# Patient Record
Sex: FEMALE | Race: WHITE | NOT HISPANIC OR LATINO | Employment: STUDENT | URBAN - METROPOLITAN AREA
[De-identification: names, ages, dates, MRNs, and addresses within clinical notes are randomized per-mention and may not be internally consistent; named-entity substitution may affect disease eponyms.]

---
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
## 2018-01-14 VITALS
SYSTOLIC BLOOD PRESSURE: 100 MMHG | WEIGHT: 118.13 LBS | BODY MASS INDEX: 21.74 KG/M2 | DIASTOLIC BLOOD PRESSURE: 64 MMHG | HEIGHT: 62 IN

## 2018-01-17 NOTE — MISCELLANEOUS
Message  Spoke with patient regarding vaginal culture results  Negative for BV, yeast, GC, chlamydia, herpes, and trichomonas  Patient going for blood work tomorrow  Has had two appointments with her therapist; going well so far  Patient states she is doing OK  We will call with blood work results  Signatures   Electronically signed by : Melanie Fish, [facility];  Aug 22 2017  4:42PM EST                       (Author)

## 2018-01-18 NOTE — MISCELLANEOUS
Message  Spoke with patient regarding blood work results  Negative for syphilis, Hep B&C, Herpes, and HIV  Patient is back at school and feeling well  Still seeing a therapist, whom she likes  Call if any issues arise  F/u in February for repeat HIV testing  Signatures   Electronically signed by : Carlton Clayton, [facility];  Aug 31 2017  5:32PM EST                       (Author)

## 2018-05-24 ENCOUNTER — APPOINTMENT (OUTPATIENT)
Dept: LAB | Age: 18
End: 2018-05-24
Payer: COMMERCIAL

## 2018-05-24 ENCOUNTER — TRANSCRIBE ORDERS (OUTPATIENT)
Dept: ADMINISTRATIVE | Age: 18
End: 2018-05-24

## 2018-05-24 ENCOUNTER — OFFICE VISIT (OUTPATIENT)
Dept: OBGYN CLINIC | Facility: CLINIC | Age: 18
End: 2018-05-24
Payer: COMMERCIAL

## 2018-05-24 VITALS
BODY MASS INDEX: 21.9 KG/M2 | DIASTOLIC BLOOD PRESSURE: 64 MMHG | HEIGHT: 61 IN | WEIGHT: 116 LBS | SYSTOLIC BLOOD PRESSURE: 100 MMHG

## 2018-05-24 DIAGNOSIS — T74.21XD RAPE OF ADULT, SUBSEQUENT ENCOUNTER: Primary | ICD-10-CM

## 2018-05-24 DIAGNOSIS — T74.21XD: ICD-10-CM

## 2018-05-24 DIAGNOSIS — Z30.011 ENCOUNTER FOR INITIAL PRESCRIPTION OF CONTRACEPTIVE PILLS: ICD-10-CM

## 2018-05-24 DIAGNOSIS — L29.2 VULVAR ITCHING: ICD-10-CM

## 2018-05-24 DIAGNOSIS — T74.21XD: Primary | ICD-10-CM

## 2018-05-24 LAB
HIV 1+2 AB+HIV1 P24 AG SERPL QL IA: NORMAL
HIV1 P24 AG SER QL: NORMAL

## 2018-05-24 PROCEDURE — 99214 OFFICE O/P EST MOD 30 MIN: CPT | Performed by: PHYSICIAN ASSISTANT

## 2018-05-24 PROCEDURE — 87806 HIV AG W/HIV1&2 ANTB W/OPTIC: CPT

## 2018-05-24 PROCEDURE — 36415 COLL VENOUS BLD VENIPUNCTURE: CPT | Performed by: PHYSICIAN ASSISTANT

## 2018-05-24 PROCEDURE — 87389 HIV-1 AG W/HIV-1&-2 AB AG IA: CPT | Performed by: PHYSICIAN ASSISTANT

## 2018-05-24 RX ORDER — ESCITALOPRAM OXALATE 20 MG/1
20 TABLET ORAL
COMMUNITY

## 2018-05-24 NOTE — PATIENT INSTRUCTIONS
Try Monistat OTC external cream for itching  We will call with culture results  Start OCP today  Need to take medication every day at the same time  Go for HIV screening
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
## 2018-05-25 ENCOUNTER — TELEPHONE (OUTPATIENT)
Dept: OBGYN CLINIC | Facility: CLINIC | Age: 18
End: 2018-05-25

## 2018-05-25 LAB — HIV 1+2 AB+HIV1 P24 AG SERPL QL IA: NORMAL

## 2018-05-30 LAB
A VAGINAE DNA VAG QL NAA+PROBE: ABNORMAL SCORE
BVAB2 DNA VAG QL NAA+PROBE: ABNORMAL SCORE
C ALBICANS DNA VAG QL NAA+PROBE: POSITIVE
C GLABRATA DNA VAG QL NAA+PROBE: NEGATIVE
C TRACH RRNA SPEC QL NAA+PROBE: NEGATIVE
MEGA1 DNA VAG QL NAA+PROBE: ABNORMAL SCORE
N GONORRHOEA RRNA SPEC QL NAA+PROBE: NEGATIVE
T VAGINALIS RRNA SPEC QL NAA+PROBE: NEGATIVE

## 2018-06-01 ENCOUNTER — TELEPHONE (OUTPATIENT)
Dept: OBGYN CLINIC | Facility: CLINIC | Age: 18
End: 2018-06-01

## 2018-06-01 DIAGNOSIS — B37.3 VAGINAL CANDIDIASIS: Primary | ICD-10-CM

## 2018-06-01 RX ORDER — FLUCONAZOLE 150 MG/1
150 TABLET ORAL ONCE
Qty: 1 TABLET | Refills: 1 | Status: SHIPPED | OUTPATIENT
Start: 2018-06-01 | End: 2018-06-01

## 2018-06-01 NOTE — TELEPHONE ENCOUNTER
Spoke with patient regarding vaginal culture results  Positive for yeast infection  Rx for Diflucan 150 mg x 1 refill sent to pharmacy  Patient can take 1 dose today, then again in a few days if symptoms do not resolve  Call with any problems

## 2018-08-21 ENCOUNTER — OFFICE VISIT (OUTPATIENT)
Dept: OBGYN CLINIC | Facility: CLINIC | Age: 18
End: 2018-08-21
Payer: COMMERCIAL

## 2018-08-21 VITALS — WEIGHT: 120 LBS | SYSTOLIC BLOOD PRESSURE: 100 MMHG | DIASTOLIC BLOOD PRESSURE: 70 MMHG | BODY MASS INDEX: 22.67 KG/M2

## 2018-08-21 DIAGNOSIS — Z30.41 ENCOUNTER FOR SURVEILLANCE OF CONTRACEPTIVE PILLS: Primary | ICD-10-CM

## 2018-08-21 PROCEDURE — 99213 OFFICE O/P EST LOW 20 MIN: CPT | Performed by: PHYSICIAN ASSISTANT

## 2018-08-21 RX ORDER — NORETHINDRONE AND ETHINYL ESTRADIOL AND FERROUS FUMARATE 0.8-25(24)
1 KIT ORAL DAILY
Qty: 28 TABLET | Refills: 3 | Status: SHIPPED | OUTPATIENT
Start: 2018-08-21 | End: 2018-11-04 | Stop reason: SDUPTHER

## 2018-08-21 NOTE — PROGRESS NOTES
Assessment/Plan:    No problem-specific Assessment & Plan notes found for this encounter  Diagnoses and all orders for this visit:    Encounter for surveillance of contraceptive pills  -     Norethin-Eth Estradiol-Fe 0 8-25 MG-MCG CHEW; Chew 1 tablet daily        Will switch patient to a different OCP  Rx for Generess x 3 refills sent to pharmacy  She can start medication today  Stressed the importance of taking medication every day at the same time  Still needs to use condoms for STD prevention  If patient starts to experience similar side effects, she should call the office rather than stopping OCP in the middle of a pack  Can always try a different OCP if needed  Explained that everyone reacts differently to different medications, and it can sometimes take a few tries before finding a birth control that works well for a patient  F/u for birth control check in 3 months  Time of visit 15 minutes; >50% spent counseling  Subjective:      Patient ID: Anjel Bills is a 19 y o  female  Patient is here for contraceptive f/u  Started on Lo Loestrin in May  Patient took medication for about 2 /12 months  Periods were regular every 28 days, and lasted for 2-3 days  Flow was very light  She started to experience increasing bloating, acne, and mood swings, so she stopped medication  Had a period that started 2 days ago  Flow was heavier than usual, but no cramping or HA  Bloating and mood swings resolved once she stopped OCP  Patient denies any change in bowel/bladder habits, pelvic pain, abdominal pain, n/v, and change in appetite  Would like to try another OCP  The following portions of the patient's history were reviewed and updated as appropriate: allergies, current medications, past family history, past medical history, past social history, past surgical history and problem list     Review of Systems   Constitutional: Negative for appetite change     Gastrointestinal: Negative for abdominal distention, abdominal pain, constipation, diarrhea, nausea and vomiting  Genitourinary: Negative for difficulty urinating, dysuria, frequency, genital sores, hematuria, menstrual problem, pelvic pain, urgency, vaginal bleeding, vaginal discharge and vaginal pain  Objective:      /70   Wt 54 4 kg (120 lb)   LMP 08/19/2018   BMI 22 67 kg/m²          Physical Exam   Constitutional: She is oriented to person, place, and time  Vital signs are normal  She appears well-developed and well-nourished  Neurological: She is alert and oriented to person, place, and time  Psychiatric: She has a normal mood and affect  Her behavior is normal  Judgment and thought content normal    Vitals reviewed

## 2018-11-04 DIAGNOSIS — Z30.41 ENCOUNTER FOR SURVEILLANCE OF CONTRACEPTIVE PILLS: ICD-10-CM

## 2018-11-06 RX ORDER — NORETHINDRONE, ETHINYL ESTRADIOL, AND FERROUS FUMARATE 0.8-25(24)
1 KIT ORAL DAILY
Qty: 28 TABLET | Refills: 0 | Status: SHIPPED | OUTPATIENT
Start: 2018-11-06 | End: 2018-11-21 | Stop reason: SINTOL

## 2018-11-21 ENCOUNTER — OFFICE VISIT (OUTPATIENT)
Dept: OBGYN CLINIC | Facility: CLINIC | Age: 18
End: 2018-11-21
Payer: COMMERCIAL

## 2018-11-21 VITALS — DIASTOLIC BLOOD PRESSURE: 64 MMHG | WEIGHT: 122.8 LBS | BODY MASS INDEX: 23.2 KG/M2 | SYSTOLIC BLOOD PRESSURE: 118 MMHG

## 2018-11-21 DIAGNOSIS — N94.3 PREMENSTRUAL SYMPTOM: ICD-10-CM

## 2018-11-21 DIAGNOSIS — Z30.41 ENCOUNTER FOR SURVEILLANCE OF CONTRACEPTIVE PILLS: Primary | ICD-10-CM

## 2018-11-21 PROCEDURE — 99213 OFFICE O/P EST LOW 20 MIN: CPT | Performed by: PHYSICIAN ASSISTANT

## 2018-11-21 RX ORDER — NORETHINDRONE ACETATE AND ETHINYL ESTRADIOL AND FERROUS FUMARATE 1MG-20(24)
1 KIT ORAL DAILY
Qty: 28 TABLET | Refills: 3 | Status: SHIPPED | OUTPATIENT
Start: 2018-11-21 | End: 2019-03-03 | Stop reason: SDUPTHER

## 2018-11-21 RX ORDER — ONDANSETRON 4 MG/1
4 TABLET, ORALLY DISINTEGRATING ORAL EVERY 8 HOURS PRN
COMMUNITY
Start: 2017-01-08 | End: 2019-03-12

## 2018-11-21 NOTE — PROGRESS NOTES
Assessment/Plan:    No problem-specific Assessment & Plan notes found for this encounter  Diagnoses and all orders for this visit:    Encounter for surveillance of contraceptive pills  -     Norethin Ace-Eth Estrad-FE 1-20 MG-MCG(24) CHEW; Chew 1 tablet daily    Premenstrual symptom    Other orders  -     ondansetron (ZOFRAN-ODT) 4 mg disintegrating tablet; Take 4 mg by mouth every 8 (eight) hours as needed        Discussed OCPs with patient  Explained that sometimes several OCPs need to be tried before finding one that works  Also discussed the possibility of switching to something like Nuva ring, Nexplanon, or an IUD  Educated patient on difference between monophasic and triphasic OCPs and effects on acne  Patient states acne seems to be related to OCP  Will try a different monophasic OCP for now  Rx for Mibelas x 3 refills sent to pharmacy  Patient to finish current pill pack, then start new medication  Call if serious side effects occur  Counseled patient on genital warts  Explained they are usually caused by HPV 6 or HPV 11  If she has never had an outbreak of warts, we will continue to monitor  Stressed the need for condom use  Birth control f/u in 3 months  Time of visit 15 minutes; >50% spent counseling  Subjective:      Patient ID: Corin Torres is a 19 y o  female  Patient is here for contraceptive follow-up  Was switched from [address] [place] [address] to Call[place]oon in August because of increased mood symptoms on Lo Loestrin  Patient states Callicoon has caused similar symptoms around her period  She has noticed an increase in mood swings and "just not feeling myself"  Acne also increases at the time of her period  Periods have been just a few days of spotting at the end of a pill pack  She denies any change in bowel/bladder habits, pelvic pain, abdominal pain, n/v, and change in appetite  Does not want to remain on this pill  Patient is still seeing her therapist and is feeling better  States her current boyfriend was diagnosed with genital warts caused by HPV  Patient is his first sexual partner  She has never noticed any genital warts  Does not think she had the Gardasil vaccine  The following portions of the patient's history were reviewed and updated as appropriate: allergies, current medications, past family history, past medical history, past social history, past surgical history and problem list     Review of Systems   Constitutional: Negative for appetite change and unexpected weight change  Gastrointestinal: Negative for abdominal distention, abdominal pain, constipation, diarrhea, nausea and vomiting  Genitourinary: Negative for difficulty urinating, dysuria, frequency, genital sores, hematuria, menstrual problem, pelvic pain, urgency, vaginal bleeding, vaginal discharge and vaginal pain  Neurological:        Mood swings         Objective:      /64   Wt 55 7 kg (122 lb 12 8 oz)   BMI 23 20 kg/m²          Physical Exam   Constitutional: She is oriented to person, place, and time  Vital signs are normal  She appears well-developed and well-nourished  Neurological: She is alert and oriented to person, place, and time  Psychiatric: She has a normal mood and affect  Her behavior is normal  Judgment and thought content normal    Vitals reviewed

## 2019-03-03 DIAGNOSIS — Z30.41 ENCOUNTER FOR SURVEILLANCE OF CONTRACEPTIVE PILLS: ICD-10-CM

## 2019-03-05 RX ORDER — NORETHINDRONE ACETATE AND ETHINYL ESTRADIOL AND FERROUS FUMARATE 1MG-20(24)
KIT ORAL
Qty: 28 TABLET | Refills: 0 | Status: SHIPPED | OUTPATIENT
Start: 2019-03-05 | End: 2019-03-12 | Stop reason: SDUPTHER

## 2019-03-12 ENCOUNTER — OFFICE VISIT (OUTPATIENT)
Dept: OBGYN CLINIC | Facility: CLINIC | Age: 19
End: 2019-03-12

## 2019-03-12 VITALS
SYSTOLIC BLOOD PRESSURE: 110 MMHG | DIASTOLIC BLOOD PRESSURE: 70 MMHG | HEIGHT: 61 IN | WEIGHT: 123 LBS | BODY MASS INDEX: 23.22 KG/M2

## 2019-03-12 DIAGNOSIS — Z30.41 ENCOUNTER FOR SURVEILLANCE OF CONTRACEPTIVE PILLS: Primary | ICD-10-CM

## 2019-03-12 PROCEDURE — 99213 OFFICE O/P EST LOW 20 MIN: CPT | Performed by: PHYSICIAN ASSISTANT

## 2019-03-12 RX ORDER — NORETHINDRONE ACETATE AND ETHINYL ESTRADIOL AND FERROUS FUMARATE 1MG-20(24)
1 KIT ORAL DAILY
Qty: 28 TABLET | Refills: 4 | Status: SHIPPED | OUTPATIENT
Start: 2019-03-12 | End: 2019-08-15 | Stop reason: SDUPTHER

## 2019-03-12 NOTE — PROGRESS NOTES
Assessment/Plan:    No problem-specific Assessment & Plan notes found for this encounter  Diagnoses and all orders for this visit:    Encounter for surveillance of contraceptive pills  -     Norethin Ace-Eth Estrad-FE 1-20 MG-MCG(24) CHEW; Chew 1 tablet daily Chew and swallow        Refills x 4 sent to pharmacy  Reassured patient that it is ok if she does not get a cycle  Stressed the need to take medication every day at the same time  If no problems, patient to f/u in July or August for yearly gyn exam   Time of visit 15 minutes; >50% spent counseling  Subjective:      Patient ID: Trudee Runner is a 22 y o  female  Patient is here for OCP f/u  States she is doing well  Does not get a period on current OCP, and denies any cyclic symptoms  Acne has improved  Patient denies any change in bowel/bladder habits, pelvic pain, intermenstrual bleeding, bloating, abdominal pain, n/v, and change in appetite  She would like refills  The following portions of the patient's history were reviewed and updated as appropriate: allergies, current medications, past family history, past medical history, past social history, past surgical history and problem list     Review of Systems   Constitutional: Negative for appetite change and unexpected weight change  Gastrointestinal: Negative for abdominal distention, abdominal pain, constipation, diarrhea, nausea and vomiting  Genitourinary: Negative for difficulty urinating, dysuria, frequency, genital sores, hematuria, menstrual problem, pelvic pain, urgency, vaginal bleeding, vaginal discharge and vaginal pain  Objective:      /70   Ht 5' 1" (1 549 m)   Wt 55 8 kg (123 lb)   BMI 23 24 kg/m²          Physical Exam   Constitutional: She is oriented to person, place, and time  Vital signs are normal  She appears well-developed and well-nourished  Neurological: She is alert and oriented to person, place, and time     Psychiatric: She has a normal mood and affect  Her behavior is normal  Judgment and thought content normal    Vitals reviewed

## 2019-08-15 ENCOUNTER — ANNUAL EXAM (OUTPATIENT)
Dept: OBGYN CLINIC | Facility: CLINIC | Age: 19
End: 2019-08-15
Payer: COMMERCIAL

## 2019-08-15 VITALS
WEIGHT: 118.8 LBS | HEIGHT: 62 IN | DIASTOLIC BLOOD PRESSURE: 50 MMHG | BODY MASS INDEX: 21.86 KG/M2 | SYSTOLIC BLOOD PRESSURE: 100 MMHG

## 2019-08-15 DIAGNOSIS — Z01.419 ENCOUNTER FOR GYNECOLOGICAL EXAMINATION WITHOUT ABNORMAL FINDING: Primary | ICD-10-CM

## 2019-08-15 DIAGNOSIS — Z11.3 SCREEN FOR STD (SEXUALLY TRANSMITTED DISEASE): ICD-10-CM

## 2019-08-15 DIAGNOSIS — Z30.41 ENCOUNTER FOR SURVEILLANCE OF CONTRACEPTIVE PILLS: ICD-10-CM

## 2019-08-15 PROCEDURE — 99395 PREV VISIT EST AGE 18-39: CPT | Performed by: PHYSICIAN ASSISTANT

## 2019-08-15 RX ORDER — NORETHINDRONE ACETATE AND ETHINYL ESTRADIOL AND FERROUS FUMARATE 1MG-20(24)
1 KIT ORAL DAILY
Qty: 84 TABLET | Refills: 3 | Status: SHIPPED | OUTPATIENT
Start: 2019-08-15 | End: 2020-07-30 | Stop reason: SDUPTHER

## 2019-08-15 NOTE — PROGRESS NOTES
Assessment/Plan:    No problem-specific Assessment & Plan notes found for this encounter  Diagnoses and all orders for this visit:    Encounter for gynecological examination without abnormal finding  -     Thinprep Pap (Refl) HPV mRNA E6/E7    Screen for STD (sexually transmitted disease)  -     Chlamydia/GC amplified DNA by PCR    Encounter for surveillance of contraceptive pills  -     Norethin Ace-Eth Estrad-FE 1-20 MG-MCG(24) CHEW; Chew 1 tablet daily Chew and swallow        Pap and GC/chlamydia testing done  We will call with STD screening results  Educated patient on proper timing and demonstrated proper technique for self-breast exam   Refills of OCP sent to pharmacy  If no problems, patient to return in 1 year for routine gyn care  Subjective:      Patient ID: Shawanda Lucia is a 22 y o  female  Patient is here for annual exam   States she is doing well  No complaints on her OCP  Patient does not get a period  She denies cramping, headache, and mood symptoms  Taking medication every day at the same time; does not skip doses  Requests refills today  Sexually active with a monogamous partner; they are using condoms  Requests STD screening today  Patient denies any change in bowel/bladder habits, pelvic pain, bloating, abdominal pain, n/v, change in appetite, and thyroid disease  Patient does not know how to perform self-breast exam   Denies new masses, skin changes, nipple discharge, and pain/tenderness  The following portions of the patient's history were reviewed and updated as appropriate: allergies, current medications, past family history, past medical history, past social history, past surgical history and problem list     Review of Systems   Constitutional: Negative for appetite change and unexpected weight change  Cardiovascular:        No masses, skin changes, nipple discharge, and pain/tenderness     Gastrointestinal: Negative for abdominal distention, abdominal pain, constipation, diarrhea, nausea and vomiting  Genitourinary: Negative for difficulty urinating, dysuria, frequency, genital sores, hematuria, menstrual problem, pelvic pain, urgency, vaginal bleeding, vaginal discharge and vaginal pain  Neurological: Negative for headaches  Objective:      /50   Ht 5' 2" (1 575 m)   Wt 53 9 kg (118 lb 12 8 oz)   BMI 21 73 kg/m²          Physical Exam   Constitutional: She is oriented to person, place, and time  Vital signs are normal  She appears well-developed and well-nourished  Neck: No thyromegaly present  Cardiovascular: Normal rate, regular rhythm and normal heart sounds  Exam reveals no gallop and no friction rub  No murmur heard  Pulmonary/Chest: Effort normal and breath sounds normal  Right breast exhibits no inverted nipple, no mass, no nipple discharge, no skin change and no tenderness  Left breast exhibits no inverted nipple, no mass, no nipple discharge, no skin change and no tenderness  No breast swelling, tenderness, discharge or bleeding  Breasts are symmetrical    Abdominal: Soft  Normal appearance and bowel sounds are normal  She exhibits no distension  There is no tenderness  Genitourinary: Vagina normal and uterus normal  No breast tenderness, discharge or bleeding  No labial fusion  There is no rash, tenderness, lesion or injury on the right labia  There is no rash, tenderness, lesion or injury on the left labia  Cervix exhibits no motion tenderness, no discharge and no friability  Right adnexum displays no mass, no tenderness and no fullness  Left adnexum displays no mass, no tenderness and no fullness  No erythema, tenderness or bleeding in the vagina  No vaginal discharge found  Lymphadenopathy:     She has no cervical adenopathy  No inguinal adenopathy noted on the right or left side  Right: No inguinal adenopathy present  Left: No inguinal adenopathy present     Neurological: She is alert and oriented to person, place, and time  Skin: Skin is warm and dry  Psychiatric: She has a normal mood and affect  Her behavior is normal  Judgment and thought content normal    Vitals reviewed

## 2019-08-16 ENCOUNTER — TELEPHONE (OUTPATIENT)
Dept: OBGYN CLINIC | Facility: CLINIC | Age: 19
End: 2019-08-16

## 2019-08-20 LAB
C TRACH RRNA SPEC QL NAA+PROBE: NOT DETECTED
CLINICAL INFO: NORMAL
CYTO CVX: NORMAL
DATE PREVIOUS BX: NORMAL
LMP START DATE: NORMAL
N GONORRHOEA RRNA SPEC QL NAA+PROBE: NOT DETECTED
SL AMB PREV. PAP:: NORMAL
SPECIMEN SOURCE CVX/VAG CYTO: NORMAL

## 2020-07-30 DIAGNOSIS — Z30.41 ENCOUNTER FOR SURVEILLANCE OF CONTRACEPTIVE PILLS: ICD-10-CM

## 2020-07-30 RX ORDER — NORETHINDRONE ACETATE AND ETHINYL ESTRADIOL AND FERROUS FUMARATE 1MG-20(24)
1 KIT ORAL DAILY
Qty: 28 TABLET | Refills: 0 | Status: SHIPPED | OUTPATIENT
Start: 2020-07-30 | End: 2020-08-06 | Stop reason: SDUPTHER

## 2020-08-06 ENCOUNTER — ANNUAL EXAM (OUTPATIENT)
Dept: OBGYN CLINIC | Facility: CLINIC | Age: 20
End: 2020-08-06
Payer: COMMERCIAL

## 2020-08-06 VITALS
HEIGHT: 62 IN | DIASTOLIC BLOOD PRESSURE: 70 MMHG | WEIGHT: 124 LBS | TEMPERATURE: 97.1 F | SYSTOLIC BLOOD PRESSURE: 106 MMHG | BODY MASS INDEX: 22.82 KG/M2

## 2020-08-06 DIAGNOSIS — Z01.419 ENCOUNTER FOR GYNECOLOGICAL EXAMINATION WITHOUT ABNORMAL FINDING: Primary | ICD-10-CM

## 2020-08-06 DIAGNOSIS — Z30.41 ENCOUNTER FOR SURVEILLANCE OF CONTRACEPTIVE PILLS: ICD-10-CM

## 2020-08-06 PROCEDURE — G0145 SCR C/V CYTO,THINLAYER,RESCR: HCPCS | Performed by: PHYSICIAN ASSISTANT

## 2020-08-06 PROCEDURE — 99395 PREV VISIT EST AGE 18-39: CPT | Performed by: PHYSICIAN ASSISTANT

## 2020-08-06 RX ORDER — NORETHINDRONE ACETATE AND ETHINYL ESTRADIOL AND FERROUS FUMARATE 1MG-20(24)
1 KIT ORAL DAILY
Qty: 84 TABLET | Refills: 3 | Status: SHIPPED | OUTPATIENT
Start: 2020-08-06 | End: 2021-07-22 | Stop reason: SDUPTHER

## 2020-08-06 NOTE — PROGRESS NOTES
Assessment/Plan:    No problem-specific Assessment & Plan notes found for this encounter  Diagnoses and all orders for this visit:    Encounter for gynecological examination without abnormal finding  -     Liquid-based pap, screening    Encounter for surveillance of contraceptive pills  -     Norethin Ace-Eth Estrad-FE 1-20 MG-MCG(24) CHEW; Chew 1 tablet daily Chew and swallow    Other orders  -     Cancel: Thinprep Pap (Refl) HPV mRNA E6/E7        Pap done  Refills of OCP sent to pharmacy  Reassured patient that amenorrhea is normal on OCP  If no problems, patient to return in 1 year for routine gyn care  Subjective:      Patient ID: Sandy Davis is a 23 y o  female  Patient is here for annual exam   States she is doing well  No complaints on her OCP  She does not get a period and denies cyclic symptoms  Requests refills today  She is sexually active with a monogamous partner; declines STD screening  Patient denies any change in bowel/bladder habits, pelvic pain, bloating, abdominal pain, n/v, change in appetite, and thyroid disease  Patient is performing self-breast exam   Denies new masses, skin changes, nipple discharge, and pain/tenderness  The following portions of the patient's history were reviewed and updated as appropriate: allergies, current medications, past family history, past medical history, past social history, past surgical history and problem list     Review of Systems   Constitutional: Negative for appetite change and unexpected weight change  Cardiovascular:        No masses, skin changes, nipple discharge, and pain/tenderness  Gastrointestinal: Negative for abdominal distention, abdominal pain, constipation, diarrhea, nausea and vomiting  Genitourinary: Negative for difficulty urinating, dysuria, frequency, genital sores, hematuria, menstrual problem, pelvic pain, urgency, vaginal bleeding, vaginal discharge and vaginal pain           Objective:      BP 106/70   Temp (!) 97 1 °F (36 2 °C)   Ht 5' 2" (1 575 m)   Wt 56 2 kg (124 lb)   BMI 22 68 kg/m²          Physical Exam   Constitutional: She is oriented to person, place, and time  She appears well-developed  She is cooperative  Neck: Neck supple  No thyromegaly present  Pulmonary/Chest: Effort normal  Right breast exhibits no inverted nipple, no mass, no nipple discharge, no skin change and no tenderness  Left breast exhibits no inverted nipple, no mass, no nipple discharge, no skin change and no tenderness  Breasts are symmetrical    Abdominal: Soft  Normal appearance  She exhibits no distension  There is no abdominal tenderness  Genitourinary:    Vulva, vagina, cervix, right adnexa and left adnexa normal    There is no rash, tenderness, lesion or injury on the right labia  There is no rash, tenderness, lesion or injury on the left labia  Right adnexum displays no mass, no tenderness and no fullness  Left adnexum displays no mass, no tenderness and no fullness  No vaginal discharge, erythema, tenderness or bleeding  No erythema, tenderness or bleeding in the vagina  Lymphadenopathy:     She has no cervical adenopathy  No inguinal adenopathy noted on the right or left side  Right: No supraclavicular adenopathy present  Left: No supraclavicular adenopathy present  Neurological: She is alert and oriented to person, place, and time  Skin: Skin is warm and dry  Psychiatric: Her behavior is normal  Mood, judgment and thought content normal    Vitals reviewed

## 2020-08-12 LAB
LAB AP GYN PRIMARY INTERPRETATION: NORMAL
Lab: NORMAL

## 2021-07-22 DIAGNOSIS — Z30.41 ENCOUNTER FOR SURVEILLANCE OF CONTRACEPTIVE PILLS: ICD-10-CM

## 2021-07-22 RX ORDER — NORETHINDRONE ACETATE AND ETHINYL ESTRADIOL AND FERROUS FUMARATE 1MG-20(24)
1 KIT ORAL DAILY
Qty: 84 TABLET | Refills: 0 | Status: SHIPPED | OUTPATIENT
Start: 2021-07-22